# Patient Record
Sex: FEMALE | Race: WHITE | Employment: OTHER | RURAL
[De-identification: names, ages, dates, MRNs, and addresses within clinical notes are randomized per-mention and may not be internally consistent; named-entity substitution may affect disease eponyms.]

---

## 2017-01-01 ENCOUNTER — TELEPHONE (OUTPATIENT)
Dept: FAMILY MEDICINE CLINIC | Age: 77
End: 2017-01-01

## 2017-01-01 ENCOUNTER — OFFICE VISIT (OUTPATIENT)
Dept: FAMILY MEDICINE CLINIC | Age: 77
End: 2017-01-01

## 2017-01-01 VITALS
RESPIRATION RATE: 18 BRPM | HEIGHT: 68 IN | SYSTOLIC BLOOD PRESSURE: 120 MMHG | WEIGHT: 207 LBS | BODY MASS INDEX: 31.37 KG/M2 | HEART RATE: 105 BPM | TEMPERATURE: 97.7 F | DIASTOLIC BLOOD PRESSURE: 70 MMHG

## 2017-01-01 DIAGNOSIS — K21.9 GASTROESOPHAGEAL REFLUX DISEASE WITHOUT ESOPHAGITIS: ICD-10-CM

## 2017-01-01 DIAGNOSIS — I10 ESSENTIAL HYPERTENSION: ICD-10-CM

## 2017-01-01 DIAGNOSIS — G47.00 INSOMNIA, UNSPECIFIED: Primary | ICD-10-CM

## 2017-01-01 RX ORDER — ZOLPIDEM TARTRATE 10 MG/1
10 TABLET ORAL
Qty: 30 TAB | Refills: 2 | Status: SHIPPED | OUTPATIENT
Start: 2017-01-01

## 2017-01-01 RX ORDER — HYDROCHLOROTHIAZIDE 25 MG/1
TABLET ORAL
Qty: 90 TAB | Refills: 3 | Status: SHIPPED | OUTPATIENT
Start: 2017-01-01

## 2017-01-01 RX ORDER — ZOLPIDEM TARTRATE 10 MG/1
10 TABLET ORAL
Qty: 30 TAB | Refills: 0 | Status: SHIPPED | OUTPATIENT
Start: 2017-01-01 | End: 2017-01-01 | Stop reason: SDUPTHER

## 2017-01-01 RX ORDER — AMLODIPINE BESYLATE 2.5 MG/1
TABLET ORAL
Qty: 90 TAB | Refills: 3 | Status: SHIPPED | OUTPATIENT
Start: 2017-01-01

## 2017-01-01 RX ORDER — RIVAROXABAN 20 MG/1
TABLET, FILM COATED ORAL
Refills: 3 | COMMUNITY
Start: 2017-01-01

## 2017-01-01 RX ORDER — FENOFIBRATE 134 MG/1
CAPSULE ORAL
Qty: 90 CAP | Refills: 3 | Status: SHIPPED | OUTPATIENT
Start: 2017-01-01

## 2017-01-01 RX ORDER — ZOLPIDEM TARTRATE 10 MG/1
TABLET ORAL
Qty: 30 TAB | Refills: 0 | OUTPATIENT
Start: 2017-01-01

## 2017-01-01 RX ORDER — ALPRAZOLAM 1 MG/1
1 TABLET ORAL
Qty: 90 TAB | Refills: 0 | Status: SHIPPED | OUTPATIENT
Start: 2017-01-01

## 2017-01-01 RX ORDER — OMEPRAZOLE 40 MG/1
CAPSULE, DELAYED RELEASE ORAL
Qty: 90 CAP | Refills: 3 | Status: SHIPPED | OUTPATIENT
Start: 2017-01-01

## 2017-02-27 ENCOUNTER — DOCUMENTATION ONLY (OUTPATIENT)
Dept: FAMILY MEDICINE CLINIC | Age: 77
End: 2017-02-27

## 2017-03-21 ENCOUNTER — OFFICE VISIT (OUTPATIENT)
Dept: FAMILY MEDICINE CLINIC | Age: 77
End: 2017-03-21

## 2017-03-21 DIAGNOSIS — I10 ESSENTIAL HYPERTENSION: ICD-10-CM

## 2017-03-21 DIAGNOSIS — R73.03 PREDIABETES: Primary | ICD-10-CM

## 2017-03-21 DIAGNOSIS — K21.9 GASTROESOPHAGEAL REFLUX DISEASE WITHOUT ESOPHAGITIS: ICD-10-CM

## 2017-03-21 DIAGNOSIS — R91.1 LESION OF LUNG: ICD-10-CM

## 2017-03-21 DIAGNOSIS — E78.2 MIXED HYPERLIPIDEMIA: ICD-10-CM

## 2017-03-21 RX ORDER — ZOLPIDEM TARTRATE 10 MG/1
TABLET ORAL
COMMUNITY
Start: 2017-03-19 | End: 2017-03-31 | Stop reason: SDUPTHER

## 2017-03-21 RX ORDER — TRIAMCINOLONE ACETONIDE 1 MG/G
CREAM TOPICAL
COMMUNITY
Start: 2017-01-02

## 2017-03-21 NOTE — MR AVS SNAPSHOT
Visit Information Date & Time Provider Department Dept. Phone Encounter #  
 3/21/2017  2:00 PM Zechariah Estrada NP [address] [phone] [id number] Upcoming Health Maintenance Date Due  
 GLAUCOMA SCREENING Q2Y 6/30/2005 OSTEOPOROSIS SCREENING (DEXA) 6/30/2005 Pneumococcal 65+ Low/Medium Risk (1 of 2 - PCV13) 6/30/2005 MEDICARE YEARLY EXAM 3/22/2018 DTaP/Tdap/Td series (2 - Td) 3/21/2027 Allergies as of 3/21/2017  Review Complete On: 3/21/2017 By: Zechariah Estrada NP Severity Noted Reaction Type Reactions Hydrocodone  06/30/2016    Other (comments) Makes me hyper Percocet [Oxycodone-acetaminophen]  06/28/2016    Rash Wellbutrin [Bupropion]  09/11/2013    Hives Current Immunizations  Reviewed on 10/25/2016 Name Date Influenza High Dose Vaccine PF 10/25/2016  8:23 AM, 10/8/2015 Influenza Vaccine 9/23/2014 12:16 PM, 9/30/2013 Not reviewed this visit You Were Diagnosed With   
  
 Codes Comments Prediabetes    -  Primary ICD-10-CM: R73.03 
ICD-9-CM: 790.29 Mixed hyperlipidemia     ICD-10-CM: E78.2 ICD-9-CM: 272.2 Essential hypertension     ICD-10-CM: I10 
ICD-9-CM: 401.9 Gastroesophageal reflux disease without esophagitis     ICD-10-CM: K21.9 ICD-9-CM: 530.81 Lesion of lung     ICD-10-CM: R91.1 ICD-9-CM: 518.89 Vitals OB Status Smoking Status Hysterectomy Former Smoker Preferred Pharmacy Pharmacy Name Phone [place] [phone] [address] [phone] Your Updated Medication List  
  
   
This list is accurate as of: 3/21/17  3:20 PM.  Always use your most recent med list. amLODIPine 2.5 mg tablet Commonly known as:  Catarina Spruce TAKE 1 TABLET EVERY DAY  
  
 fenofibrate micronized 134 mg capsule Commonly known as:  LOFIBRA TAKE 1 CAPSULE EVERY MORNING  
  
 hydroCHLOROthiazide 25 mg tablet Commonly known as:  HYDRODIURIL  
TAKE 1 TABLET EVERY DAY  
  
 omeprazole 40 mg capsule Commonly known as:  PRILOSEC  
TAKE 1 CAPSULE EVERY DAY  
  
 tiotropium 18 mcg inhalation capsule Commonly known as:  Theotis Muss Take 1 Cap by inhalation daily as needed. triamcinolone acetonide 0.1 % topical cream  
Commonly known as:  KENALOG  
  
 zolpidem 10 mg tablet Commonly known as:  AMBIEN We Performed the Following CBC WITH AUTOMATED DIFF [87026 CPT(R)] COLLECTION VENOUS BLOOD,VENIPUNCTURE U0814932 CPT(R)] LIPID PANEL [84417 CPT(R)] METABOLIC PANEL, BASIC [70503 CPT(R)] To-Do List   
 03/21/2017 Imaging:  CT CHEST W CONT Introducing [facility] & HEALTH SERVICES! Trev Thomason introduces [facility] patient portal. Now you can access parts of your medical record, email your doctor's office, and request medication refills online. 1. In your internet browser, go to [email or web address] 2. Click on the First Time User? Click Here link in the Sign In box. You will see the New Member Sign Up page. 3. Enter your [facility] Access Code exactly as it appears below. You will not need to use this code after youve completed the sign-up process. If you do not sign up before the expiration date, you must request a new code. · [facility] Access Code: [facility] Expires: 6/19/2017  2:59 PM 
 
4. Enter the last four digits of your Social Security Number (xxxx) and Date of Birth (mm/dd/yyyy) as indicated and click Submit. You will be taken to the next sign-up page. 5. Create a iTOKt ID. This will be your [facility] login ID and cannot be changed, so think of one that is secure and easy to remember. 6. Create a [facility] password. You can change your password at any time. 7. Enter your Password Reset Question and Answer. This can be used at a later time if you forget your password. 8. Enter your e-mail address. You will receive e-mail notification when new information is available in [address] 9. Click Sign Up. You can now view and download portions of your medical record. 10. Click the Download Summary menu link to download a portable copy of your medical information. If you have questions, please visit the Frequently Asked Questions section of the [employer] website. Remember, [employer] is NOT to be used for urgent needs. For medical emergencies, dial 911. Now available from your iPhone and Android! Please provide this summary of care documentation to your next provider. Your primary care clinician is listed as Benji Hampton. If you have any questions after today's visit, please call [phone].

## 2017-03-22 LAB
BASOPHILS # BLD AUTO: 0 X10E3/UL (ref 0–0.2)
BASOPHILS NFR BLD AUTO: 0 %
BUN SERPL-MCNC: 11 MG/DL (ref 8–27)
BUN/CREAT SERPL: 16 (ref 11–26)
CALCIUM SERPL-MCNC: 9.8 MG/DL (ref 8.7–10.3)
CHLORIDE SERPL-SCNC: 96 MMOL/L (ref 96–106)
CHOLEST SERPL-MCNC: 165 MG/DL (ref 100–199)
CO2 SERPL-SCNC: 27 MMOL/L (ref 18–29)
CREAT SERPL-MCNC: 0.69 MG/DL (ref 0.57–1)
EOSINOPHIL # BLD AUTO: 0.1 X10E3/UL (ref 0–0.4)
EOSINOPHIL NFR BLD AUTO: 2 %
ERYTHROCYTE [DISTWIDTH] IN BLOOD BY AUTOMATED COUNT: 14.1 % (ref 12.3–15.4)
GLUCOSE SERPL-MCNC: 94 MG/DL (ref 65–99)
HCT VFR BLD AUTO: 41.9 % (ref 34–46.6)
HDLC SERPL-MCNC: 53 MG/DL
HGB BLD-MCNC: 14.1 G/DL (ref 11.1–15.9)
IMM GRANULOCYTES # BLD: 0 X10E3/UL (ref 0–0.1)
IMM GRANULOCYTES NFR BLD: 0 %
LDLC SERPL CALC-MCNC: 97 MG/DL (ref 0–99)
LYMPHOCYTES # BLD AUTO: 1.8 X10E3/UL (ref 0.7–3.1)
LYMPHOCYTES NFR BLD AUTO: 22 %
MCH RBC QN AUTO: 28.4 PG (ref 26.6–33)
MCHC RBC AUTO-ENTMCNC: 33.7 G/DL (ref 31.5–35.7)
MCV RBC AUTO: 84 FL (ref 79–97)
MONOCYTES # BLD AUTO: 0.5 X10E3/UL (ref 0.1–0.9)
MONOCYTES NFR BLD AUTO: 7 %
NEUTROPHILS # BLD AUTO: 5.6 X10E3/UL (ref 1.4–7)
NEUTROPHILS NFR BLD AUTO: 69 %
PLATELET # BLD AUTO: 377 X10E3/UL (ref 150–379)
POTASSIUM SERPL-SCNC: 4 MMOL/L (ref 3.5–5.2)
RBC # BLD AUTO: 4.97 X10E6/UL (ref 3.77–5.28)
SODIUM SERPL-SCNC: 141 MMOL/L (ref 134–144)
TRIGL SERPL-MCNC: 77 MG/DL (ref 0–149)
VLDLC SERPL CALC-MCNC: 15 MG/DL (ref 5–40)
WBC # BLD AUTO: 8 X10E3/UL (ref 3.4–10.8)

## 2017-03-22 NOTE — PROGRESS NOTES
CBC no anemia  Metabolic panel liver and  kidney functions are normal  Lipid panel normal great job!

## 2017-03-30 ENCOUNTER — TELEPHONE (OUTPATIENT)
Dept: FAMILY MEDICINE CLINIC | Age: 77
End: 2017-03-30

## 2017-03-31 ENCOUNTER — OFFICE VISIT (OUTPATIENT)
Dept: FAMILY MEDICINE CLINIC | Age: 77
End: 2017-03-31

## 2017-03-31 VITALS
WEIGHT: 211.6 LBS | OXYGEN SATURATION: 99 % | TEMPERATURE: 96.6 F | HEART RATE: 79 BPM | DIASTOLIC BLOOD PRESSURE: 74 MMHG | SYSTOLIC BLOOD PRESSURE: 132 MMHG | BODY MASS INDEX: 33.64 KG/M2

## 2017-03-31 DIAGNOSIS — I10 ESSENTIAL HYPERTENSION: ICD-10-CM

## 2017-03-31 DIAGNOSIS — R91.1 LESION OF RIGHT LUNG: Primary | ICD-10-CM

## 2017-03-31 DIAGNOSIS — G47.00 INSOMNIA, UNSPECIFIED: ICD-10-CM

## 2017-03-31 DIAGNOSIS — J44.9 CHRONIC OBSTRUCTIVE PULMONARY DISEASE, UNSPECIFIED COPD TYPE (HCC): ICD-10-CM

## 2017-03-31 RX ORDER — ZOLPIDEM TARTRATE 10 MG/1
10 TABLET ORAL
Qty: 30 TAB | Refills: 0 | Status: SHIPPED | OUTPATIENT
Start: 2017-03-31 | End: 2017-01-01 | Stop reason: SDUPTHER

## 2017-03-31 RX ORDER — ALPRAZOLAM 1 MG/1
1 TABLET ORAL
Qty: 90 TAB | Refills: 0 | Status: SHIPPED | OUTPATIENT
Start: 2017-03-31 | End: 2017-01-01 | Stop reason: SDUPTHER

## 2017-03-31 NOTE — MR AVS SNAPSHOT
Visit Information Date & Time Provider Department Dept. Phone Encounter #  
 3/31/2017  1:00 PM Nash Mir NP [address] [phone] [id number] Upcoming Health Maintenance Date Due  
 GLAUCOMA SCREENING Q2Y 6/30/2005 OSTEOPOROSIS SCREENING (DEXA) 6/30/2005 Pneumococcal 65+ Low/Medium Risk (1 of 2 - PCV13) 6/30/2005 MEDICARE YEARLY EXAM 3/22/2018 DTaP/Tdap/Td series (2 - Td) 3/21/2027 Allergies as of 3/31/2017  Review Complete On: 3/31/2017 By: Sandor Child RN Severity Noted Reaction Type Reactions Hydrocodone  06/30/2016    Other (comments) Makes me hyper Percocet [Oxycodone-acetaminophen]  06/28/2016    Rash Wellbutrin [Bupropion]  09/11/2013    Hives Current Immunizations  Reviewed on 10/25/2016 Name Date Influenza High Dose Vaccine PF 10/25/2016  8:23 AM, 10/8/2015 Influenza Vaccine 9/23/2014 12:16 PM, 9/30/2013 Not reviewed this visit Vitals BP Pulse Temp Weight(growth percentile) SpO2 BMI  
 132/74 (BP 1 Location: Left arm, BP Patient Position: Sitting) 79 96.6 °F (35.9 °C) (Temporal) 211 lb 9.6 oz (96 kg) 99% 33.64 kg/m2 OB Status Smoking Status Hysterectomy Former Smoker BMI and BSA Data Body Mass Index Body Surface Area  
 33.64 kg/m 2 2.12 m 2 Preferred Pharmacy Pharmacy Name Phone [address] [phone] Your Updated Medication List  
  
   
This list is accurate as of: 3/31/17  1:29 PM.  Always use your most recent med list.  
  
  
  
  
 ALPRAZolam 1 mg tablet Commonly known as:  Veronica Ruvalcaba Take 1 Tab by mouth three (3) times daily as needed for Anxiety. Max Daily Amount: 3 mg. amLODIPine 2.5 mg tablet Commonly known as:  Corinne Lees TAKE 1 TABLET EVERY DAY  
  
 fenofibrate micronized 134 mg capsule Commonly known as:  LOFIBRA TAKE 1 CAPSULE EVERY MORNING  
  
 hydroCHLOROthiazide 25 mg tablet Commonly known as:  HYDRODIURIL  
TAKE 1 TABLET EVERY DAY  
  
 omeprazole 40 mg capsule Commonly known as:  PRILOSEC  
TAKE 1 CAPSULE EVERY DAY  
  
 tiotropium 18 mcg inhalation capsule Commonly known as:  Josephus Hence Take 1 Cap by inhalation daily as needed. triamcinolone acetonide 0.1 % topical cream  
Commonly known as:  KENALOG  
  
 zolpidem 10 mg tablet Commonly known as:  AMBIEN Take 1 Tab by mouth nightly as needed for Sleep. Max Daily Amount: 10 mg.  
  
  
  
  
Prescriptions Printed Refills  
 zolpidem (AMBIEN) 10 mg tablet 0 Sig: Take 1 Tab by mouth nightly as needed for Sleep. Max Daily Amount: 10 mg.  
 Class: Print Route: Oral  
 ALPRAZolam (XANAX) 1 mg tablet 0 Sig: Take 1 Tab by mouth three (3) times daily as needed for Anxiety. Max Daily Amount: 3 mg. Class: Print Route: Oral  
  
Introducing [facility] & [facility] SERVICES! Dear Yonathan Carranza: Thank you for requesting a [employer] account. Our records indicate that you already have an active [employer] account. You can access your account anytime at [email or web address] Did you know that you can access your hospital and ER discharge instructions at any time in Equ[employer]board? You can also review all of your test results from your hospital stay or ER visit. Additional Information If you have questions, please visit the Frequently Asked Questions section of the [employer] website at [email or web address] Remember, Equipboard is NOT to be used for urgent needs. For medical emergencies, dial 911. Now available from your iPhone and Android! Please provide this summary of care documentation to your next provider. Your primary care clinician is listed as Renaee Boas. If you have any questions after today's visit, please call [phone].

## 2017-04-02 NOTE — PROGRESS NOTES
Subjective:     Pancho Gibbs is a 68 y.o. female who presents today with the following:  Chief Complaint   Patient presents with    Abnormal Lab Results     [facility] here for results from CT scan of the chest.   Results reviewed with patient as noted below. Appointment with Dr. Vesta Dominguez at [facility] 4/3/17 at [address] [place] [address]. Time for questions and reassurance. CT disc in patient's possession. FINDINGS:  2.4 x 1.7 cm pleural-based mass in the anterior aspect of the lingula. The mass  abuts the pericardial fat. 4 mm right middle lobe nodule seen on the prior exam  is not visualized with confidence. There is a calcified at the right lung base.          Atherosclerotic changes within the thoracic aorta without evidence of aneurysm  or dissection. New AP window lymphadenopathy with the largest lymph node  measuring 3.2 cm in maximum diameter. Large subcarinal lymph node measuring 3.6  cm maximally which is partially calcified. Mildly enlarged left hilar region  lymph nodes.     There is diffuse fatty infiltration of the liver. Multiple calcified lymph nodes  are seen within the spleen.     IMPRESSION  IMPRESSION:      1. 2.4 x 1.7 cm lingular mass likely representing a primary bronchogenic  carcinoma. 2. AP window, subcarinal and left hilar region lymphadenopathy. ROS:  Gen: denies fever, chills, fatigue, weight loss, weight gain. Positive for insomnia difficulty falling asleep and staying asleep.   HEENT:denies blurry vision, nasal congestion, sore throat  Resp: positive for  dyspnea on exertion, cough, wheezing  CV: denies chest pain radiating to the jaws or arms, palpitations, lower extremity edema  Abd: denies nausea, vomiting, diarrhea, constipation  Neuro: denies numbness/tingling  Endo: denies polyuria, polydipsia, heat/cold intolerance  Heme: no lymphadenopathy    Allergies   Allergen Reactions    Hydrocodone Other (comments)     Makes me hyper    Percocet [Oxycodone-Acetaminophen] Rash    Wellbutrin [Bupropion] Hives         Current Outpatient Prescriptions:     zolpidem (AMBIEN) 10 mg tablet, Take 1 Tab by mouth nightly as needed for Sleep. Max Daily Amount: 10 mg., Disp: 30 Tab, Rfl: 0    ALPRAZolam (XANAX) 1 mg tablet, Take 1 Tab by mouth three (3) times daily as needed for Anxiety. Max Daily Amount: 3 mg., Disp: 90 Tab, Rfl: 0    triamcinolone acetonide (KENALOG) 0.1 % topical cream, , Disp: , Rfl:     omeprazole (PRILOSEC) 40 mg capsule, TAKE 1 CAPSULE EVERY DAY, Disp: 90 Cap, Rfl: 3    hydrochlorothiazide (HYDRODIURIL) 25 mg tablet, TAKE 1 TABLET EVERY DAY, Disp: 90 Tab, Rfl: 3    amLODIPine (NORVASC) 2.5 mg tablet, TAKE 1 TABLET EVERY DAY, Disp: 90 Tab, Rfl: 3    fenofibrate micronized (LOFIBRA) 134 mg capsule, TAKE 1 CAPSULE EVERY MORNING, Disp: 90 Cap, Rfl: 3    tiotropium (SPIRIVA) 18 mcg inhalation capsule, Take 1 Cap by inhalation daily as needed. , Disp: , Rfl:     Past Medical History:   Diagnosis Date    Adjustment disorder with depressed mood 2011    Essential hypertension, benign 2008    GERD (gastroesophageal reflux disease)     Hypopotassemia 2008    Insomnia, unspecified 2009    Osteoarthrosis, unspecified whether generalized or localized, lower leg 2008    Other and unspecified hyperlipidemia 2008    Pernicious anemia 2009    in her 25s    Unspecified transient cerebral ischemia 2012       Past Surgical History:   Procedure Laterality Date    HX HYSTERECTOMY  1980    HX KNEE REPLACEMENT Left     HX ORTHOPAEDIC Bilateral     hammertoe surgery    HX TONSILLECTOMY  1948    w/ adenoids       History   Smoking Status    Former Smoker    Packs/day: 1.00    Years: 40.00    Types: Cigarettes    Quit date: 10/1/2008   Smokeless Tobacco    Never Used       Social History     Social History    Marital status: SINGLE     Spouse name: N/A    Number of children: N/A    Years of education: N/A     Social History Main Topics    Smoking status: Former Smoker Packs/day: 1.00     Years: 40.00     Types: Cigarettes     Quit date: 10/1/2008    Smokeless tobacco: Never Used    Alcohol use Yes      Comment: on occasion    Drug use: No    Sexual activity: Not on file     Other Topics Concern     Service Yes    Blood Transfusions Yes    Caffeine Concern No    Occupational Exposure Yes     ,asbestos    Hobby Hazards No    Sleep Concern Yes    Stress Concern No    Weight Concern Yes     over weight    Special Diet No    Back Care No    Exercise Yes    Bike Helmet No     n/a    Seat Belt Yes    Self-Exams Yes     Social History Narrative       Family History   Problem Relation Age of Onset    Alzheimer Mother     Osteoporosis Mother     Hypertension Mother     Cancer Father      Lung and Larynx    Hypertension Father     Arthritis-osteo Father     Cancer Brother      Lung    Other Sister      eye blood vessel problems    Heart Disease Paternal Grandmother     Hypertension Paternal Grandmother     Cancer Paternal Grandfather      Stomach/colon    Cancer Paternal Aunt      Bladder         Objective:     Visit Vitals    /74 (BP 1 Location: Left arm, BP Patient Position: Sitting)    Pulse 79    Temp 96.6 °F (35.9 °C) (Temporal)    Wt 211 lb 9.6 oz (96 kg)    SpO2 99%    BMI 33.64 kg/m2     Body mass index is 33.64 kg/(m^2). General: Alert and oriented. No acute distress. Well nourished  HEENT :     Eyes: pupils equal, round, react to light and accommodation. Extra ocular movements intact. Nose: patent. Mouth and throat is clear. Neck:supple full range of motion no thyromegaly. Trachea midline, No carotid bruits. No significant lymphadenopathy  Lungs[de-identified] clear to auscultation without wheezes, rales, or rhonchi. Heart :RRR, S1 & S2 are normal intensity.  No murmur; no gallop      Results for orders placed or performed in visit on 03/21/17   CBC WITH AUTOMATED DIFF   Result Value Ref Range    WBC 8.0 3.4 - 10.8 x10E3/uL    RBC 4. 97 3.77 - 5.28 x10E6/uL    HGB 14.1 11.1 - 15.9 g/dL    HCT 41.9 34.0 - 46.6 %    MCV 84 79 - 97 fL    MCH 28.4 26.6 - 33.0 pg    MCHC 33.7 31.5 - 35.7 g/dL    RDW 14.1 12.3 - 15.4 %    PLATELET 531 803 - 608 x10E3/uL    NEUTROPHILS 69 %    Lymphocytes 22 %    MONOCYTES 7 %    EOSINOPHILS 2 %    BASOPHILS 0 %    ABS. NEUTROPHILS 5.6 1.4 - 7.0 x10E3/uL    Abs Lymphocytes 1.8 0.7 - 3.1 x10E3/uL    ABS. MONOCYTES 0.5 0.1 - 0.9 x10E3/uL    ABS. EOSINOPHILS 0.1 0.0 - 0.4 x10E3/uL    ABS. BASOPHILS 0.0 0.0 - 0.2 x10E3/uL    IMMATURE GRANULOCYTES 0 %    ABS. IMM. GRANS. 0.0 0.0 - 0.1 x10E3/uL   LIPID PANEL   Result Value Ref Range    Cholesterol, total 165 100 - 199 mg/dL    Triglyceride 77 0 - 149 mg/dL    HDL Cholesterol 53 >39 mg/dL    VLDL, calculated 15 5 - 40 mg/dL    LDL, calculated 97 0 - 99 mg/dL   METABOLIC PANEL, BASIC   Result Value Ref Range    Glucose 94 65 - 99 mg/dL    BUN 11 8 - 27 mg/dL    Creatinine 0.69 0.57 - 1.00 mg/dL    GFR est non-AA 85 >59 mL/min/1.73    GFR est AA 98 >59 mL/min/1.73    BUN/Creatinine ratio 16 11 - 26    Sodium 141 134 - 144 mmol/L    Potassium 4.0 3.5 - 5.2 mmol/L    Chloride 96 96 - 106 mmol/L    CO2 27 18 - 29 mmol/L    Calcium 9.8 8.7 - 10.3 mg/dL       No results found for this visit on 03/31/17. Assessment/ Plan:     Teja Hanson was seen today for abnormal lab results. Diagnoses and all orders for this visit:    Lesion of right lung  -     REFERRAL FOR COLONOSCOPY    Chronic obstructive pulmonary disease, unspecified COPD type ([facility]  -     REFERRAL FOR COLONOSCOPY    Essential hypertension    Insomnia, unspecified    Other orders  -     zolpidem (AMBIEN) 10 mg tablet; Take 1 Tab by mouth nightly as needed for Sleep. Max Daily Amount: 10 mg.  -     ALPRAZolam (XANAX) 1 mg tablet; Take 1 Tab by mouth three (3) times daily as needed for Anxiety. Max Daily Amount: 3 mg. 1. Lesion of right lung    2.  Chronic obstructive pulmonary disease, unspecified COPD type ([facility] 3. Essential hypertension    4. Insomnia, unspecified        Orders Placed This Encounter    REFERRAL FOR COLONOSCOPY     Referral Priority:   Routine     Referral Type:   Consultation     Referral Reason:   Specialty Services Required     Requested Specialty:   Surgery    zolpidem (AMBIEN) 10 mg tablet     Sig: Take 1 Tab by mouth nightly as needed for Sleep. Max Daily Amount: 10 mg. Dispense:  30 Tab     Refill:  0    ALPRAZolam (XANAX) 1 mg tablet     Sig: Take 1 Tab by mouth three (3) times daily as needed for Anxiety. Max Daily Amount: 3 mg. Dispense:  90 Tab     Refill:  0     Referral Dr. Anastasiya Saldnaa thoracic surgeon for evaluation and management of lung lesion. (appointment 4/3/17 at [facility]). Verbal and written instructions (see AVS) provided.  Patient expresses understanding of diagnosis and treatment plan.     LUCIANO NyeC

## 2017-04-03 ENCOUNTER — TELEPHONE (OUTPATIENT)
Dept: FAMILY MEDICINE CLINIC | Age: 77
End: 2017-04-03

## 2017-04-03 ENCOUNTER — DOCUMENTATION ONLY (OUTPATIENT)
Dept: FAMILY MEDICINE CLINIC | Age: 77
End: 2017-04-03

## 2017-04-04 ENCOUNTER — CLINICAL SUPPORT (OUTPATIENT)
Dept: FAMILY MEDICINE CLINIC | Age: 77
End: 2017-04-04

## 2017-04-04 DIAGNOSIS — Z02.89 PAIN MEDICATION AGREEMENT COMPLETED: Primary | ICD-10-CM

## 2017-04-04 LAB
INR BLD: 1
PT POC: 11.5 SEC
VALID INTERNAL CONTROL?: YES

## 2017-04-04 RX ORDER — ACETAMINOPHEN AND CODEINE PHOSPHATE 300; 30 MG/1; MG/1
1 TABLET ORAL
Qty: 90 TAB | Refills: 0 | Status: SHIPPED | OUTPATIENT
Start: 2017-04-04

## 2017-04-04 NOTE — MR AVS SNAPSHOT
Visit Information Date & Time Provider Department Dept. Phone Encounter #  
 4/4/2017 11:00 AM [facility] [address] [facility] [phone] [id number] Upcoming Health Maintenance Date Due  
 GLAUCOMA SCREENING Q2Y 6/30/2005 OSTEOPOROSIS SCREENING (DEXA) 6/30/2005 Pneumococcal 65+ Low/Medium Risk (1 of 2 - PCV13) 6/30/2005 MEDICARE YEARLY EXAM 3/22/2018 DTaP/Tdap/Td series (2 - Td) 3/21/2027 Allergies as of 4/4/2017  Review Complete On: 4/2/2017 By: Michael Ennis NP Severity Noted Reaction Type Reactions Hydrocodone  06/30/2016    Other (comments) Makes me hyper Percocet [Oxycodone-acetaminophen]  06/28/2016    Rash Wellbutrin [Bupropion]  09/11/2013    Hives Current Immunizations  Reviewed on 10/25/2016 Name Date Influenza High Dose Vaccine PF 10/25/2016  8:23 AM, 10/8/2015 Influenza Vaccine 9/23/2014 12:16 PM, 9/30/2013 Not reviewed this visit You Were Diagnosed With   
  
 Codes Comments Pain medication agreement completed    -  Primary ICD-10-CM: Z02.89 ICD-9-CM: V58.69 Vitals OB Status Smoking Status Hysterectomy Former Smoker Preferred Pharmacy Pharmacy Name Phone [facility] [phone] Your Updated Medication List  
  
   
This list is accurate as of: 4/4/17 11:50 AM.  Always use your most recent med list.  
  
  
  
  
 acetaminophen-codeine 300-30 mg per tablet Commonly known as:  TYLENOL #3 Take 1 Tab by mouth every four (4) hours as needed for Pain. Max Daily Amount: 6 Tabs. Indications: Pain ALPRAZolam 1 mg tablet Commonly known as:  Binta Geri Take 1 Tab by mouth three (3) times daily as needed for Anxiety. Max Daily Amount: 3 mg. amLODIPine 2.5 mg tablet Commonly known as:  Yoli Buckhorn TAKE 1 TABLET EVERY DAY  
  
 fenofibrate micronized 134 mg capsule Commonly known as:  LOFIBRA TAKE 1 CAPSULE EVERY MORNING  
  
 hydroCHLOROthiazide 25 mg tablet Commonly known as:  HYDRODIURIL  
TAKE 1 TABLET EVERY DAY  
  
 omeprazole 40 mg capsule Commonly known as:  PRILOSEC  
TAKE 1 CAPSULE EVERY DAY  
  
 tiotropium 18 mcg inhalation capsule Commonly known as:  Ander Apa Take 1 Cap by inhalation daily as needed. triamcinolone acetonide 0.1 % topical cream  
Commonly known as:  KENALOG  
  
 zolpidem 10 mg tablet Commonly known as:  AMBIEN Take 1 Tab by mouth nightly as needed for Sleep. Max Daily Amount: 10 mg. We Performed the Following 10-PANEL URINE DRUG SCREEN [JAF52262 Custom] AMB POC PT/INR [69755 CPT(R)] COLLECTION CAPILLARY BLOOD SPECIMEN [60387 CPT(R)] Introducing [facility] & [facility] SERVICES! Dear Niko Lucas: Thank you for requesting a Edufii account. Our records indicate that you already have an active Eduf[employer] account. You can access your account anytime at [email or web address] Did you know that you can access your hospital and ER discharge instructions at any time in Edufii? You can also review all of your test results from your hospital stay or ER visit. Additional Information If you have questions, please visit the Frequently Asked Questions section of the Edu[employer]ii website at [email or web address] Remember, Edufii is NOT to be used for urgent needs. For medical emergencies, dial 911. Now available from your iPhone and Android! Please provide this summary of care documentation to your next provider. Your primary care clinician is listed as Daniel Dela Cruz. If you have any questions after today's visit, please call [phone].

## 2017-04-04 NOTE — PROGRESS NOTES
4/4/17  Spoke with patient yesterday  Saw Dr. Vesta Dominguez at [facility] suspecting small cell lung cancer. Requesting pain medication. Good results with Tylenol with Codeine in the past.  RX for tylenol # 3 granted. Appointment with Dr. Vesta Dominguez in approx 2 weeks.   Nasir Maynard NP-C

## 2017-04-11 LAB
AMPHETAMINES UR QL SCN: NEGATIVE NG/ML
BARBITURATES UR QL SCN: NEGATIVE NG/ML
BENZODIAZ UR QL: NEGATIVE
BZE UR QL: NEGATIVE NG/ML
CANNABINOIDS UR QL SCN: NEGATIVE NG/ML
MDMA UR QL SCN: NEGATIVE NG/ML
METHADONE UR QL SCN: NEGATIVE NG/ML
METHAQUALONE UR QL: NEGATIVE NG/ML
OPIATES UR QL: NEGATIVE NG/ML
PCP UR QL: NEGATIVE NG/ML
PROPOXYPH UR QL: NEGATIVE NG/ML

## 2017-04-18 ENCOUNTER — TELEPHONE (OUTPATIENT)
Dept: FAMILY MEDICINE CLINIC | Age: 77
End: 2017-04-18

## 2017-04-18 ENCOUNTER — DOCUMENTATION ONLY (OUTPATIENT)
Dept: FAMILY MEDICINE CLINIC | Age: 77
End: 2017-04-18

## 2017-04-18 DIAGNOSIS — C34.12 MALIGNANT NEOPLASM OF UPPER LOBE OF LEFT LUNG (HCC): Primary | ICD-10-CM

## 2017-04-18 NOTE — TELEPHONE ENCOUNTER
Pt came in and left report of Fine Needle aspiration report. She has small cell carcinoma. She states she needs an order for a brain MRI. She will get it at [facility]. Lizz Hogue She is getting a PET scan at [facility] this Friday @ 1:00 pm. Please order. See report in your box.

## 2017-04-19 ENCOUNTER — DOCUMENTATION ONLY (OUTPATIENT)
Dept: FAMILY MEDICINE CLINIC | Age: 77
End: 2017-04-19

## 2017-04-19 ENCOUNTER — TELEPHONE (OUTPATIENT)
Dept: FAMILY MEDICINE CLINIC | Age: 77
End: 2017-04-19

## 2017-05-26 RX ORDER — FUROSEMIDE 20 MG/1
TABLET ORAL
Qty: 30 TAB | Refills: 1 | Status: SHIPPED | OUTPATIENT
Start: 2017-05-26

## 2017-06-23 ENCOUNTER — TELEPHONE (OUTPATIENT)
Dept: FAMILY MEDICINE CLINIC | Age: 77
End: 2017-06-23

## 2017-06-23 NOTE — TELEPHONE ENCOUNTER
Letter typed and faxed to [employer].  Confirmation scanned. Letter mailed with confirmation to patient for her records.

## 2017-06-23 NOTE — TELEPHONE ENCOUNTER
Patient's [address] of 04/04/2017 was not covered because it was coded as routine.   Can you give me the correct codes and I'll send a letter to [facility]?    Dewey Bella 2174 #[id number]

## 2017-08-28 NOTE — PROGRESS NOTES
Subjective:     Miguelito Schultz is a 68 y.o. female who presents today with the following:  Chief Complaint   Patient presents with    Medication Refill    Follow-up   Miguelito Schultz presents for follow up for chronic medical conditions. Completed chemotherapy for small cell lung cancer. Oncologist will reevaluate in October. Would like to travel in the interim. GERD: asymptomatic on omeprazole. COMPLIANT WITH MEDICATION:   HTN; Denies chest pain, dyspnea, palpitations, headache and blurred vision. Blood pressure normotensive. Insomnia: Takes Ambien 10 mg po prn for sleep. ROS:  Gen: denies fever, chills, fatigue, weight loss, weight gain  HEENT:denies blurry vision, nasal congestion, sore throat  Resp: denies dypsnea, cough, wheezing  CV: denies chest pain radiating to the jaws or arms, palpitations, lower extremity edema  Abd: denies nausea, vomiting, diarrhea, constipation  Neuro: denies numbness/tingling  Endo: denies polyuria, polydipsia, heat/cold intolerance  Heme: no lymphadenopathy    Allergies   Allergen Reactions    Hydrocodone Other (comments)     Makes me hyper    Percocet [Oxycodone-Acetaminophen] Rash    Wellbutrin [Bupropion] Hives         Current Outpatient Prescriptions:     hydroCHLOROthiazide (HYDRODIURIL) 25 mg tablet, TAKE 1 TABLET EVERY DAY, Disp: 90 Tab, Rfl: 3    amLODIPine (NORVASC) 2.5 mg tablet, TAKE 1 TABLET EVERY DAY, Disp: 90 Tab, Rfl: 3    fenofibrate micronized (LOFIBRA) 134 mg capsule, TAKE 1 CAPSULE EVERY MORNING, Disp: 90 Cap, Rfl: 3    omeprazole (PRILOSEC) 40 mg capsule, TAKE 1 CAPSULE EVERY DAY, Disp: 90 Cap, Rfl: 3    zolpidem (AMBIEN) 10 mg tablet, Take 1 Tab by mouth nightly as needed for Sleep. Max Daily Amount: 10 mg. Indications: SLEEP-ONSET INSOMNIA, Disp: 30 Tab, Rfl: 0    potassium chloride SA (MICRO-K) 10 mEq capsule, Take 10 mEq by mouth daily. , Disp: , Rfl:     magnesium oxide (MAG-OX) 400 mg tablet, Take 400 mg by mouth two (2) times a day. , Disp: , Rfl:     enoxaparin (LOVENOX) 100 mg/mL, 100 mg by SubCUTAneous route every twelve (12) hours. , Disp: , Rfl:     furosemide (LASIX) 20 mg tablet, Take in the morning for edema. Increase potassium foods. Indications: Edema, Disp: 30 Tab, Rfl: 1    diphenhydrAMINE (BENADRYL) 25 mg capsule, Take 25 mg by mouth nightly as needed. Indications: ALLERGIC RHINITIS, Disp: , Rfl:     acetaminophen-codeine (TYLENOL #3) 300-30 mg per tablet, Take 1 Tab by mouth every four (4) hours as needed for Pain. Max Daily Amount: 6 Tabs. Indications: Pain, Disp: 90 Tab, Rfl: 0    ALPRAZolam (XANAX) 1 mg tablet, Take 1 Tab by mouth three (3) times daily as needed for Anxiety.  Max Daily Amount: 3 mg., Disp: 90 Tab, Rfl: 0    triamcinolone acetonide (KENALOG) 0.1 % topical cream, , Disp: , Rfl:     XARELTO 20 mg tab tablet, TAKE ONE TABLET BY MOUTH EVERY DAY, Disp: , Rfl: 3    Past Medical History:   Diagnosis Date    Adjustment disorder with depressed mood 2011    Cancer [facility])     Essential hypertension, benign 2008    GERD (gastroesophageal reflux disease)     Hypopotassemia 2008    Insomnia, unspecified 2009    Osteoarthrosis, unspecified whether generalized or localized, lower leg 2008    Other and unspecified hyperlipidemia 2008    Pernicious anemia 2009    in her 25s    Unspecified transient cerebral ischemia 2012    UTI (urinary tract infection) 06/24/2017       Past Surgical History:   Procedure Laterality Date    HX HYSTERECTOMY  1980    HX KNEE REPLACEMENT Left     HX ORTHOPAEDIC Bilateral     hammertoe surgery    HX TONSILLECTOMY  1948    w/ adenoids    HX VASCULAR ACCESS         History   Smoking Status    Former Smoker    Packs/day: 1.00    Years: 40.00    Types: Cigarettes    Quit date: 10/1/2008   Smokeless Tobacco    Never Used       Social History     Social History    Marital status:      Spouse name: N/A    Number of children: N/A    Years of education: N/A     Social History Main Topics    Smoking status: Former Smoker     Packs/day: 1.00     Years: 40.00     Types: Cigarettes     Quit date: 10/1/2008    Smokeless tobacco: Never Used    Alcohol use Yes      Comment: on occasion    Drug use: No    Sexual activity: Not Asked     Other Topics Concern     Service Yes    Blood Transfusions Yes    Caffeine Concern No    Occupational Exposure Yes     ,asbestos    Hobby Hazards No    Sleep Concern Yes    Stress Concern No    Weight Concern Yes     over weight    Special Diet No    Back Care No    Exercise Yes    Bike Helmet No     n/a    Seat Belt Yes    Self-Exams Yes     Social History Narrative       Family History   Problem Relation Age of Onset    Alzheimer Mother     Osteoporosis Mother     Hypertension Mother     Cancer Father      Lung and Larynx    Hypertension Father     Arthritis-osteo Father     Cancer Brother      Lung    Other Sister      eye blood vessel problems    Heart Disease Paternal Grandmother     Hypertension Paternal Grandmother     Cancer Paternal Grandfather      Stomach/colon    Cancer Paternal Aunt      Bladder         Objective:     Visit Vitals    /70    Pulse (!) 105    Temp 97.7 °F (36.5 °C) (Oral)    Resp 18    Ht 5' 7.5\" (1.715 m)    Wt 207 lb (93.9 kg)    BMI 31.94 kg/m2     Body mass index is 31.94 kg/(m^2). General: Alert and oriented. No acute distress. Well nourished  HEENT : Hair loss  Ears:TMs are normal. Canals are clear. Eyes: pupils equal, round, react to light and accommodation. Extra ocular movements intact. Nose: patent. Mouth and throat is clear. Neck:supple full range of motion no thyromegaly. Trachea midline, No carotid bruits. No significant lymphadenopathy  Lungs[de-identified] clear to auscultation without wheezes, rales, or rhonchi. Heart :RRR, S1 & S2 are normal intensity. No murmur; no gallop  Abdomen: bowel sounds active.  No tenderness, guarding, rebound, masses, hepatic or spleen enlargement  Back: no CVA tenderness. Extremities: without clubbing, cyanosis, or edema  Pulses: radial and femoral pulses are normal  Neuro: HMF intact. Cranial nerves II through XII grossly normal.  Motor: is 5 over 5 and symmetrical.   Deep tendon reflexes: +2 equal        No results found for this visit on 08/28/17. Assessment/ Plan:     Diagnoses and all orders for this visit:    1. Insomnia, unspecified    2. Essential hypertension  -     hydroCHLOROthiazide (HYDRODIURIL) 25 mg tablet; TAKE 1 TABLET EVERY DAY  -     amLODIPine (NORVASC) 2.5 mg tablet; TAKE 1 TABLET EVERY DAY    3. Gastroesophageal reflux disease without esophagitis  -     omeprazole (PRILOSEC) 40 mg capsule; TAKE 1 CAPSULE EVERY DAY    Other orders  -     fenofibrate micronized (LOFIBRA) 134 mg capsule; TAKE 1 CAPSULE EVERY MORNING  -     zolpidem (AMBIEN) 10 mg tablet; Take 1 Tab by mouth nightly as needed for Sleep. Max Daily Amount: 10 mg. Indications: SLEEP-ONSET INSOMNIA         1. Insomnia, unspecified    2. Essential hypertension    3. Gastroesophageal reflux disease without esophagitis        Orders Placed This Encounter    XARELTO 20 mg tab tablet     Sig: TAKE ONE TABLET BY MOUTH EVERY DAY     Refill:  3    hydroCHLOROthiazide (HYDRODIURIL) 25 mg tablet     Sig: TAKE 1 TABLET EVERY DAY     Dispense:  90 Tab     Refill:  3    amLODIPine (NORVASC) 2.5 mg tablet     Sig: TAKE 1 TABLET EVERY DAY     Dispense:  90 Tab     Refill:  3    fenofibrate micronized (LOFIBRA) 134 mg capsule     Sig: TAKE 1 CAPSULE EVERY MORNING     Dispense:  90 Cap     Refill:  3    omeprazole (PRILOSEC) 40 mg capsule     Sig: TAKE 1 CAPSULE EVERY DAY     Dispense:  90 Cap     Refill:  3    zolpidem (AMBIEN) 10 mg tablet     Sig: Take 1 Tab by mouth nightly as needed for Sleep. Max Daily Amount: 10 mg.  Indications: SLEEP-ONSET INSOMNIA     Dispense:  30 Tab     Refill:  0   RTO in 4 months or sooner for f/u for chronic medical conditions or soner as needed. Verbal and written instructions (see AVS) provided.  Patient expresses understanding of diagnosis and treatment plan.     Misael Azar, CONNIE-C

## 2017-08-28 NOTE — MR AVS SNAPSHOT
Visit Information Date & Time Provider Department Dept. Phone Encounter #  
 8/28/2017  2:00 PM Eric Mccauley NP [address] [phone] [id number] Upcoming Health Maintenance Date Due OSTEOPOROSIS SCREENING (DEXA) 6/30/2005 Pneumococcal 65+ High/Highest Risk (1 of 2 - PCV13) 6/30/2005 INFLUENZA AGE 9 TO ADULT 8/1/2017 MEDICARE YEARLY EXAM 3/22/2018 GLAUCOMA SCREENING Q2Y 12/22/2018 DTaP/Tdap/Td series (2 - Td) 3/21/2027 Allergies as of 8/28/2017  Review Complete On: 8/28/2017 By: Eric Mccauley NP Severity Noted Reaction Type Reactions Hydrocodone  06/30/2016    Other (comments) Makes me hyper Percocet [Oxycodone-acetaminophen]  06/28/2016    Rash Wellbutrin [Bupropion]  09/11/2013    Hives Current Immunizations  Reviewed on 7/27/2017 Name Date Influenza High Dose Vaccine PF 10/25/2016  8:23 AM, 10/8/2015 Influenza Vaccine 9/23/2014 12:16 PM, 9/30/2013 Not reviewed this visit You Were Diagnosed With   
  
 Codes Comments Insomnia, unspecified    -  Primary ICD-10-CM: G47.00 ICD-9-CM: 780.52 Essential hypertension     ICD-10-CM: I10 
ICD-9-CM: 401.9 Gastroesophageal reflux disease without esophagitis     ICD-10-CM: K21.9 ICD-9-CM: 530.81 Vitals BP Pulse Temp Resp Height(growth percentile) Weight(growth percentile) 120/70 (!) 105 97.7 °F (36.5 °C) (Oral) 18 5' 7.5\" (1.715 m) 207 lb (93.9 kg) BMI OB Status Smoking Status 31.94 kg/m2 Hysterectomy Former Smoker BMI and BSA Data Body Mass Index Body Surface Area 31.94 kg/m 2 2.11 m 2 Preferred Pharmacy [facility] [id number] [phone] Your Updated Medication List  
  
   
This list is accurate as of: 8/28/17  3:00 PM.  Always use your most recent med list.  
  
  
  
  
 acetaminophen-codeine 300-30 mg per tablet Commonly known as:  TYLENOL #3 Take 1 Tab by mouth every four (4) hours as needed for Pain. Max Daily Amount: 6 Tabs. Indications: Pain ALPRAZolam 1 mg tablet Commonly known as:  Shermon Collie Take 1 Tab by mouth three (3) times daily as needed for Anxiety. Max Daily Amount: 3 mg. amLODIPine 2.5 mg tablet Commonly known as:  Achilles Alma TAKE 1 TABLET EVERY DAY  
  
 diphenhydrAMINE 25 mg capsule Commonly known as:  BENADRYL Take 25 mg by mouth nightly as needed. Indications: ALLERGIC RHINITIS  
  
 fenofibrate micronized 134 mg capsule Commonly known as:  LOFIBRA TAKE 1 CAPSULE EVERY MORNING  
  
 furosemide 20 mg tablet Commonly known as:  LASIX Take in the morning for edema. Increase potassium foods. Indications: Edema  
  
 hydroCHLOROthiazide 25 mg tablet Commonly known as:  HYDRODIURIL  
TAKE 1 TABLET EVERY DAY  
  
 LOVENOX 100 mg/mL Generic drug:  enoxaparin 100 mg by SubCUTAneous route every twelve (12) hours. magnesium oxide 400 mg tablet Commonly known as:  MAG-OX Take 400 mg by mouth two (2) times a day. omeprazole 40 mg capsule Commonly known as:  PRILOSEC  
TAKE 1 CAPSULE EVERY DAY  
  
 potassium chloride SA 10 mEq capsule Commonly known as:  Darilyn Rising Take 10 mEq by mouth daily. triamcinolone acetonide 0.1 % topical cream  
Commonly known as:  KENALOG  
  
 XARELTO 20 mg Tab tablet Generic drug:  rivaroxaban TAKE ONE TABLET BY MOUTH EVERY DAY  
  
 zolpidem 10 mg tablet Commonly known as:  AMBIEN Take 1 Tab by mouth nightly as needed for Sleep. Max Daily Amount: 10 mg. Indications: SLEEP-ONSET INSOMNIA Prescriptions Printed Refills  
 zolpidem (AMBIEN) 10 mg tablet 0 Sig: Take 1 Tab by mouth nightly as needed for Sleep. Max Daily Amount: 10 mg. Indications: SLEEP-ONSET INSOMNIA Class: Print Route: Oral  
  
Prescriptions Sent to Pharmacy Refills hydroCHLOROthiazide (HYDRODIURIL) 25 mg tablet 3 Sig: TAKE 1 TABLET EVERY DAY Class: Normal  
 Pharmacy: [address] [place] [address], [address] [place] [address] Ph #: [phone]  
 amLODIPine (NORVASC) 2.5 mg tablet 3 Sig: TAKE 1 TABLET EVERY DAY Class: Normal  
 Pharmacy: [address] [place] [address], [address] [place] [address] Ph #: [phone]  
 fenofibrate micronized (LOFIBRA) 134 mg capsule 3 Sig: TAKE 1 CAPSULE EVERY MORNING Class: Normal  
 Pharmacy: [address] [place] [address], [address] [place] [address] Ph #: [phone]  
 omeprazole (PRILOSEC) 40 mg capsule 3 Sig: TAKE 1 CAPSULE EVERY DAY Class: Normal  
 Pharmacy: [address] [place] [address], [address] [place] [address] Ph #: [phone] Introducing [address]! Dear Alvaro Elliott: Thank you for requesting a [employer] account. Our records indicate that you already have an active [employer] account. You can access your account anytime at [email or web address] Did you know that you can access your hospital and ER discharge instructions at any time in [employer]? You can also review all of your test results from your hospital stay or ER visit. Additional Information If you have questions, please visit the Frequently Asked Questions section of the [employer] website at [email or web address] Remember, [employer] is NOT to be used for urgent needs. For medical emergencies, dial 911. Now available from your iPhone and Android! Please provide this summary of care documentation to your next provider. Your primary care clinician is listed as Neela Desouza. If you have any questions after today's visit, please call [phone].

## 2020-02-11 NOTE — TELEPHONE ENCOUNTER
Patient is requesting a refill on alprazolam and zolpidem. [employer].
Refill request sent to [facility].
SICU

## 2021-07-20 NOTE — PROGRESS NOTES
Seth Galvan is a 68 y.o. female and presents for annual Medicare Wellness Visit. Problem List: Reviewed with patient and discussed risk factors.     Patient Active Problem List   Diagnosis Code    Insomnia, unspecified G47.00    GERD (gastroesophageal reflux disease) K21.9    Other malaise and fatigue R53.81, R53.83    Chronic airway obstruction, not elsewhere classified ([facility] [address] J44.9    Pernicious anemia D51.0    Hypopotassemia E87.6    Hypertension I10    Hyperlipidemia E78.5    Prediabetes R73.03    Primary osteoarthritis of right knee M17.11       Current medical providers:  Patient Care Team:  Deidra Garcia NP as PCP - General (Nurse Practitioner)    PSH: Reviewed with patient  Past Surgical History:   Procedure Laterality Date    HX HYSTERECTOMY  1980    HX KNEE REPLACEMENT Left     HX ORTHOPAEDIC Bilateral     hammertoe surgery    HX TONSILLECTOMY  1948    w/ adenoids        SH: Reviewed with patient  Social History   Substance Use Topics    Smoking status: Former Smoker     Packs/day: 1.00     Years: 40.00     Types: Cigarettes     Quit date: 10/1/2008    Smokeless tobacco: Never Used    Alcohol use Yes      Comment: on occasion       FH: Reviewed with patient  Family History   Problem Relation Age of Onset    Alzheimer Mother     Osteoporosis Mother     Hypertension Mother     Cancer Father      Lung and Larynx    Hypertension Father     Arthritis-osteo Father     Cancer Brother      Lung    Other Sister      eye blood vessel problems    Heart Disease Paternal Grandmother     Hypertension Paternal Grandmother     Cancer Paternal Grandfather      Stomach/colon    Cancer Paternal Aunt      Bladder       Medications/Allergies: Reviewed with patient  Current Outpatient Prescriptions on File Prior to Visit   Medication Sig Dispense Refill    omeprazole (PRILOSEC) 40 mg capsule TAKE 1 CAPSULE EVERY DAY 90 Cap 3    hydrochlorothiazide (HYDRODIURIL) 25 mg tablet TAKE 1 TABLET EVERY DAY 90 Tab 3    amLODIPine (NORVASC) 2.5 mg tablet TAKE 1 TABLET EVERY DAY 90 Tab 3    fenofibrate micronized (LOFIBRA) 134 mg capsule TAKE 1 CAPSULE EVERY MORNING 90 Cap 3    tiotropium (SPIRIVA) 18 mcg inhalation capsule Take 1 Cap by inhalation daily as needed. No current facility-administered medications on file prior to visit. Allergies   Allergen Reactions    Hydrocodone Other (comments)     Makes me hyper    Percocet [Oxycodone-Acetaminophen] Rash    Wellbutrin [Bupropion] Hives       Objective: There were no vitals taken for this visit. There is no height or weight on file to calculate BMI. Assessment of cognitive impairment: Alert and oriented x 3    Depression Screen:   PHQ 2 / 9, over the last two weeks 10/25/2016   Little interest or pleasure in doing things Not at all   Feeling down, depressed or hopeless Not at all   Total Score PHQ 2 0       Fall Risk Assessment:    Fall Risk Assessment, last 12 mths 10/25/2016   Able to walk? Yes   Fall in past 12 months? Yes   Fall with injury? Yes   Number of falls in past 12 months 3   Fall Risk Score 4       Functional Ability:   Does the patient exhibit a steady gait? yes   How long did it take the patient to get up and walk from a sitting position? 4 seconds   Is the patient self reliant?  (ie can do own laundry, meals, household chores)  yes     Does the patient handle his/her own medications? yes     Does the patient handle his/her own money? yes     Is the patients home safe (ie good lighting, handrails on stairs and bath, etc.)? yes     Did you notice or did patient express any hearing difficulties? no     Did you notice or did patient express any vision difficulties?   no     Were distance and reading eye charts used?   no       Advance Care Planning:   Patient was offered the opportunity to discuss advance care planning:  yes     Does patient have an Advance Directive:  no   If no, did you provide information on C[facility] Connections? yes       Plan:      Orders Placed This Encounter    COLLECTION VENOUS BLOOD,VENIPUNCTURE    CT CHEST W CONT    CBC WITH AUTOMATED DIFF    LIPID PANEL    METABOLIC PANEL, BASIC    triamcinolone acetonide (KENALOG) 0.1 % topical cream    zolpidem (AMBIEN) 10 mg tablet       Health Maintenance   Topic Date Due    GLAUCOMA SCREENING Q2Y  06/30/2005    OSTEOPOROSIS SCREENING (DEXA)  06/30/2005    Pneumococcal 65+ Low/Medium Risk (1 of 2 - PCV13) 06/30/2005    MEDICARE YEARLY EXAM  03/22/2018    DTaP/Tdap/Td series (2 - Td) 03/21/2027    ZOSTER VACCINE AGE 60>  Completed    INFLUENZA AGE 9 TO ADULT  Completed       *Patient verbalized understanding and agreement with the plan. A copy of the After Visit Summary with personalized health plan was given to the patient today. Subjective:     Enmanuel Lauren is a 68 y.o. female who presents today with the following:  Chief Complaint   Patient presents with   Anthony Lindsey Annual Wellness Visit     initial   Deisi Wang goes by [facility]. She enjoys being outdoors , walking on her tread mill . She plans on bike riding this summer. HM refused TDAP , had Prevnar at [facility]. (request records). Discussed colonoscopy she will consider. Mammogram a year ago. GERD: Prilosec is working well.       ROS:  Gen: denies fever, chills, fatigue, weight loss, weight gain  HEENT:denies blurry vision, nasal congestion, sore throat  Resp: denies dypsnea, cough, wheezing  CV: denies chest pain radiating to the jaws or arms, palpitations, lower extremity edema  Abd: denies nausea, vomiting, diarrhea, constipation  Neuro: denies numbness/tingling  Endo: denies polyuria, polydipsia, heat/cold intolerance  Heme: no lymphadenopathy    Allergies   Allergen Reactions    Hydrocodone Other (comments)     Makes me hyper    Percocet [Oxycodone-Acetaminophen] Rash    Wellbutrin [Bupropion] Hives         Current Outpatient Prescriptions:     triamcinolone acetonide (KENALOG) 0.1 % topical cream, , Disp: , Rfl:     zolpidem (AMBIEN) 10 mg tablet, , Disp: , Rfl:     omeprazole (PRILOSEC) 40 mg capsule, TAKE 1 CAPSULE EVERY DAY, Disp: 90 Cap, Rfl: 3    hydrochlorothiazide (HYDRODIURIL) 25 mg tablet, TAKE 1 TABLET EVERY DAY, Disp: 90 Tab, Rfl: 3    amLODIPine (NORVASC) 2.5 mg tablet, TAKE 1 TABLET EVERY DAY, Disp: 90 Tab, Rfl: 3    fenofibrate micronized (LOFIBRA) 134 mg capsule, TAKE 1 CAPSULE EVERY MORNING, Disp: 90 Cap, Rfl: 3    tiotropium (SPIRIVA) 18 mcg inhalation capsule, Take 1 Cap by inhalation daily as needed. , Disp: , Rfl:     Past Medical History:   Diagnosis Date    Adjustment disorder with depressed mood 2011    Essential hypertension, benign 2008    GERD (gastroesophageal reflux disease)     Hypopotassemia 2008    Insomnia, unspecified 2009    Osteoarthrosis, unspecified whether generalized or localized, lower leg 2008    Other and unspecified hyperlipidemia 2008    Pernicious anemia 2009    in her 25s    Unspecified transient cerebral ischemia 2012       Past Surgical History:   Procedure Laterality Date    HX HYSTERECTOMY  1980    HX KNEE REPLACEMENT Left     HX ORTHOPAEDIC Bilateral     hammertoe surgery    HX TONSILLECTOMY  1948    w/ adenoids       History   Smoking Status    Former Smoker    Packs/day: 1.00    Years: 40.00    Types: Cigarettes    Quit date: 10/1/2008   Smokeless Tobacco    Never Used       Social History     Social History    Marital status: SINGLE     Spouse name: N/A    Number of children: N/A    Years of education: N/A     Social History Main Topics    Smoking status: Former Smoker     Packs/day: 1.00     Years: 40.00     Types: Cigarettes     Quit date: 10/1/2008    Smokeless tobacco: Never Used    Alcohol use Yes      Comment: on occasion    Drug use: No    Sexual activity: Not Asked     Other Topics Concern     Service Yes    Blood Transfusions Yes    Caffeine Concern No    Occupational Exposure Yes ,asbestos    Hobby Hazards No    Sleep Concern Yes    Stress Concern No    Weight Concern Yes     over weight    Special Diet No    Back Care No    Exercise Yes    Bike Helmet No     n/a    Seat Belt Yes    Self-Exams Yes     Social History Narrative       Family History   Problem Relation Age of Onset    Alzheimer Mother     Osteoporosis Mother     Hypertension Mother     Cancer Father      Lung and Larynx    Hypertension Father     Arthritis-osteo Father     Cancer Brother      Lung    Other Sister      eye blood vessel problems    Heart Disease Paternal Grandmother     Hypertension Paternal Grandmother     Cancer Paternal Grandfather      Stomach/colon    Cancer Paternal Aunt      Bladder         Objective: There were no vitals taken for this visit. There is no height or weight on file to calculate BMI. General: Alert and oriented. No acute distress. Well nourished  HEENT :  Ears:TMs are normal. Canals are clear. Eyes: pupils equal, round, react to light and accommodation. Extra ocular movements intact. Nose: patent. Mouth and throat is clear. Neck:supple full range of motion no thyromegaly. Trachea midline, No carotid bruits. No significant lymphadenopathy  Lungs[de-identified] clear to auscultation without wheezes, rales, or rhonchi. Heart :RRR, S1 & S2 are normal intensity. No murmur; no gallop  Abdomen: bowel sounds active. No tenderness, guarding, rebound, masses, hepatic or spleen enlargement  Back: no CVA tenderness. Extremities: without clubbing, cyanosis, or edema  Pulses: radial and femoral pulses are normal  Neuro: HMF intact.  Cranial nerves II through XII grossly normal.  Motor: is 5 over 5 and symmetrical.   Deep tendon reflexes: +2 equal    Results for orders placed or performed during the hospital encounter of 07/12/16   PROTHROMBIN TIME + INR   Result Value Ref Range    INR 1.4 (H) 0.9 - 1.1      Prothrombin time 14.4 (H) 9.0 - 02.2 sec   METABOLIC PANEL, BASIC Result Value Ref Range    Sodium 142 136 - 145 mmol/L    Potassium 4.0 3.5 - 5.1 mmol/L    Chloride 106 97 - 108 mmol/L    CO2 27 21 - 32 mmol/L    Anion gap 9 5 - 15 mmol/L    Glucose 83 65 - 100 mg/dL    BUN 16 6 - 20 MG/DL    Creatinine 0.73 0.55 - 1.02 MG/DL    BUN/Creatinine ratio 22 (H) 12 - 20      GFR est AA >60 >60 ml/min/1.73m2    GFR est non-AA >60 >60 ml/min/1.73m2    Calcium 7.7 (L) 8.5 - 10.1 MG/DL   HEMOGLOBIN   Result Value Ref Range    HGB 10.1 (L) 11.5 - 16.0 g/dL   PROTHROMBIN TIME + INR   Result Value Ref Range    INR 1.9 (H) 0.9 - 1.1      Prothrombin time 19.7 (H) 9.0 - 77.4 sec   METABOLIC PANEL, BASIC   Result Value Ref Range    Sodium 140 136 - 145 mmol/L    Potassium 4.5 3.5 - 5.1 mmol/L    Chloride 106 97 - 108 mmol/L    CO2 28 21 - 32 mmol/L    Anion gap 6 5 - 15 mmol/L    Glucose 96 65 - 100 mg/dL    BUN 21 (H) 6 - 20 MG/DL    Creatinine 0.69 0.55 - 1.02 MG/DL    BUN/Creatinine ratio 30 (H) 12 - 20      GFR est AA >60 >60 ml/min/1.73m2    GFR est non-AA >60 >60 ml/min/1.73m2    Calcium 8.5 8.5 - 10.1 MG/DL   HEMOGLOBIN   Result Value Ref Range    HGB 10.1 (L) 11.5 - 16.0 g/dL       No results found for this visit on 03/21/17. Assessment/ Plan:     Randal Farris was seen today for annual wellness visit. Diagnoses and all orders for this visit:    Prediabetes  -     CBC WITH AUTOMATED DIFF  -     METABOLIC PANEL, BASIC  -     COLLECTION VENOUS BLOOD,VENIPUNCTURE    Mixed hyperlipidemia  -     LIPID PANEL  -     METABOLIC PANEL, BASIC  -     COLLECTION VENOUS BLOOD,VENIPUNCTURE    Essential hypertension  -     CBC WITH AUTOMATED DIFF  -     METABOLIC PANEL, BASIC  -     COLLECTION VENOUS BLOOD,VENIPUNCTURE    Gastroesophageal reflux disease without esophagitis    Lesion of lung  -     CT CHEST W CONT; Future         1. Prediabetes    2. Mixed hyperlipidemia    3. Essential hypertension    4. Gastroesophageal reflux disease without esophagitis    5.  Lesion of lung        Orders Placed This Encounter    COLLECTION VENOUS BLOOD,VENIPUNCTURE    CT CHEST W CONT     Standing Status:   Future     Standing Expiration Date:   4/21/2018     Order Specific Question:   Is Patient Allergic to Contrast Dye? Answer:   No    CBC WITH AUTOMATED DIFF    LIPID PANEL    METABOLIC PANEL, BASIC    triamcinolone acetonide (KENALOG) 0.1 % topical cream    zolpidem (AMBIEN) 10 mg tablet     RTO in 4 months or sooner as needed. Verbal and written instructions (see AVS) provided.  Patient expresses understanding of diagnosis and treatment plan.     LUCIANO GiangC Adequate: hears normal conversation without difficulty